# Patient Record
Sex: FEMALE | Race: WHITE
[De-identification: names, ages, dates, MRNs, and addresses within clinical notes are randomized per-mention and may not be internally consistent; named-entity substitution may affect disease eponyms.]

---

## 2017-11-22 ENCOUNTER — HOSPITAL ENCOUNTER (EMERGENCY)
Dept: HOSPITAL 92 - ERS | Age: 42
Discharge: HOME | End: 2017-11-22
Payer: COMMERCIAL

## 2017-11-22 DIAGNOSIS — F41.9: ICD-10-CM

## 2017-11-22 DIAGNOSIS — Z79.899: ICD-10-CM

## 2017-11-22 DIAGNOSIS — G43.909: ICD-10-CM

## 2017-11-22 DIAGNOSIS — I10: Primary | ICD-10-CM

## 2017-11-22 LAB
ALP SERPL-CCNC: 90 U/L (ref 40–150)
ALT SERPL W P-5'-P-CCNC: 16 U/L (ref 8–55)
ANION GAP SERPL CALC-SCNC: 12 MMOL/L (ref 10–20)
AST SERPL-CCNC: 18 U/L (ref 5–34)
BASOPHILS # BLD AUTO: 0.1 THOU/UL (ref 0–0.2)
BASOPHILS NFR BLD AUTO: 0.7 % (ref 0–1)
BILIRUB SERPL-MCNC: 0.3 MG/DL (ref 0.2–1.2)
BUN SERPL-MCNC: 6 MG/DL (ref 7–18.7)
CALCIUM SERPL-MCNC: 9.5 MG/DL (ref 7.8–10.44)
CHLORIDE SERPL-SCNC: 105 MMOL/L (ref 98–107)
CO2 SERPL-SCNC: 26 MMOL/L (ref 22–29)
CREAT CL PREDICTED SERPL C-G-VRATE: 0 ML/MIN (ref 70–130)
EOSINOPHIL # BLD AUTO: 0.1 THOU/UL (ref 0–0.7)
EOSINOPHIL NFR BLD AUTO: 1.1 % (ref 0–10)
GLOBULIN SER CALC-MCNC: 3.1 G/DL (ref 2.4–3.5)
HCT VFR BLD CALC: 40.4 % (ref 36–47)
LYMPHOCYTES # BLD: 3.5 THOU/UL (ref 1.2–3.4)
LYMPHOCYTES NFR BLD AUTO: 36.1 % (ref 21–51)
MAGNESIUM SERPL-MCNC: 2.4 MG/DL (ref 1.6–2.6)
MONOCYTES # BLD AUTO: 0.7 THOU/UL (ref 0.11–0.59)
MONOCYTES NFR BLD AUTO: 7.1 % (ref 0–10)
NEUTROPHILS # BLD AUTO: 5.3 THOU/UL (ref 1.4–6.5)
RBC # BLD AUTO: 4.65 MILL/UL (ref 4.2–5.4)
TROPONIN I SERPL DL<=0.01 NG/ML-MCNC: (no result) NG/ML (ref ?–0.03)
WBC # BLD AUTO: 9.6 THOU/UL (ref 4.8–10.8)

## 2017-11-22 PROCEDURE — 80053 COMPREHEN METABOLIC PANEL: CPT

## 2017-11-22 PROCEDURE — 83735 ASSAY OF MAGNESIUM: CPT

## 2017-11-22 PROCEDURE — 93005 ELECTROCARDIOGRAM TRACING: CPT

## 2017-11-22 PROCEDURE — 94760 N-INVAS EAR/PLS OXIMETRY 1: CPT

## 2017-11-22 PROCEDURE — 84703 CHORIONIC GONADOTROPIN ASSAY: CPT

## 2017-11-22 PROCEDURE — 85025 COMPLETE CBC W/AUTO DIFF WBC: CPT

## 2017-11-22 PROCEDURE — 83880 ASSAY OF NATRIURETIC PEPTIDE: CPT

## 2017-11-22 PROCEDURE — 84484 ASSAY OF TROPONIN QUANT: CPT

## 2017-11-22 PROCEDURE — 82553 CREATINE MB FRACTION: CPT

## 2019-03-24 ENCOUNTER — HOSPITAL ENCOUNTER (EMERGENCY)
Dept: HOSPITAL 92 - ERS | Age: 44
Discharge: HOME | End: 2019-03-24
Payer: COMMERCIAL

## 2019-03-24 DIAGNOSIS — R07.9: Primary | ICD-10-CM

## 2019-03-24 DIAGNOSIS — I10: ICD-10-CM

## 2019-03-24 DIAGNOSIS — Z79.899: ICD-10-CM

## 2019-03-24 LAB
ALBUMIN SERPL BCG-MCNC: 4.5 G/DL (ref 3.5–5)
ALP SERPL-CCNC: 79 U/L (ref 40–150)
ALT SERPL W P-5'-P-CCNC: 20 U/L (ref 8–55)
ANION GAP SERPL CALC-SCNC: 11 MMOL/L (ref 10–20)
AST SERPL-CCNC: 28 U/L (ref 5–34)
BASOPHILS # BLD AUTO: 0.1 THOU/UL (ref 0–0.2)
BASOPHILS NFR BLD AUTO: 0.9 % (ref 0–1)
BILIRUB SERPL-MCNC: 0.3 MG/DL (ref 0.2–1.2)
BUN SERPL-MCNC: 8 MG/DL (ref 7–18.7)
CALCIUM SERPL-MCNC: 9.7 MG/DL (ref 7.8–10.44)
CHLORIDE SERPL-SCNC: 104 MMOL/L (ref 98–107)
CO2 SERPL-SCNC: 26 MMOL/L (ref 22–29)
CREAT CL PREDICTED SERPL C-G-VRATE: 0 ML/MIN (ref 70–130)
EOSINOPHIL # BLD AUTO: 0.2 THOU/UL (ref 0–0.7)
EOSINOPHIL NFR BLD AUTO: 1.3 % (ref 0–10)
GLOBULIN SER CALC-MCNC: 2.9 G/DL (ref 2.4–3.5)
GLUCOSE SERPL-MCNC: 106 MG/DL (ref 70–105)
HGB BLD-MCNC: 12.9 G/DL (ref 12–16)
LIPASE SERPL-CCNC: 39 U/L (ref 8–78)
LYMPHOCYTES # BLD: 4.7 THOU/UL (ref 1.2–3.4)
LYMPHOCYTES NFR BLD AUTO: 32.9 % (ref 21–51)
MCH RBC QN AUTO: 28.3 PG (ref 27–31)
MCV RBC AUTO: 86.3 FL (ref 78–98)
MONOCYTES # BLD AUTO: 1 THOU/UL (ref 0.11–0.59)
MONOCYTES NFR BLD AUTO: 6.8 % (ref 0–10)
NEUTROPHILS # BLD AUTO: 8.2 THOU/UL (ref 1.4–6.5)
NEUTROPHILS NFR BLD AUTO: 58.1 % (ref 42–75)
PLATELET # BLD AUTO: 360 THOU/UL (ref 130–400)
POTASSIUM SERPL-SCNC: 3.4 MMOL/L (ref 3.5–5.1)
RBC # BLD AUTO: 4.58 MILL/UL (ref 4.2–5.4)
SODIUM SERPL-SCNC: 138 MMOL/L (ref 136–145)
TROPONIN I SERPL DL<=0.01 NG/ML-MCNC: (no result) NG/ML (ref ?–0.03)
WBC # BLD AUTO: 14.2 THOU/UL (ref 4.8–10.8)

## 2019-03-24 PROCEDURE — 76705 ECHO EXAM OF ABDOMEN: CPT

## 2019-03-24 PROCEDURE — 83605 ASSAY OF LACTIC ACID: CPT

## 2019-03-24 PROCEDURE — 85025 COMPLETE CBC W/AUTO DIFF WBC: CPT

## 2019-03-24 PROCEDURE — 80053 COMPREHEN METABOLIC PANEL: CPT

## 2019-03-24 PROCEDURE — 71045 X-RAY EXAM CHEST 1 VIEW: CPT

## 2019-03-24 PROCEDURE — 93005 ELECTROCARDIOGRAM TRACING: CPT

## 2019-03-24 PROCEDURE — 83690 ASSAY OF LIPASE: CPT

## 2019-03-24 PROCEDURE — 36415 COLL VENOUS BLD VENIPUNCTURE: CPT

## 2019-03-24 PROCEDURE — 84484 ASSAY OF TROPONIN QUANT: CPT

## 2019-03-24 NOTE — RAD
PORTABLE UPRIGT FRONTAL CHEST RADIOGRAPH:

 

03/24/2019

 

HISTORY:

Mid sternal chest pain.

 

COMPARISON:

None.

 

FINDINGS:

The lungs are clear.  The heart and mediastinal contour is unremarkable.

 

A focal area of calcification is noted, adjacent to the humeral head, on the left, laterally, suggest
ing calcific tendinosis.

 

IMPRESSION:

No acute findings.

 

POS: [facility]

## 2019-03-24 NOTE — ULT
RIGHT UPPER QUADRANT ULTRASOUND:

 

03/24/2019

 

HISTORY:

Right upper quadrant pain.  Epigastric pain.

 

TECHNIQUE:

Multiplanar gray-scale sonographic imaging of the right upper quadrant provided.

 

FINDINGS:

The hepatic parenchyma is mildly heterogeneous and echogenic.  The imaged pancreas is unremarkable.  
The body and tail are obscured by bowel gas.  No discrete focal liver lesion is identified.

 

The sonographer reports a negative Benson sign.  No gallbladder wall thickening or pericholecystic fl
uid.  No gallstones noted.  The CBD measures 3 mm, within normal limits.  The right kidney measures 1
1.4 cm in craniocaudal dimension and demonstrates no stone, hydronephrosis, or mass.

 

IMPRESSION:

No sonographic evidence of cholecystitis, cholelithiasis, or biliary dilatation.

 

POS: [facility]